# Patient Record
Sex: MALE | URBAN - NONMETROPOLITAN AREA
[De-identification: names, ages, dates, MRNs, and addresses within clinical notes are randomized per-mention and may not be internally consistent; named-entity substitution may affect disease eponyms.]

---

## 2019-10-15 ENCOUNTER — APPOINTMENT (OUTPATIENT)
Dept: URBAN - NONMETROPOLITAN AREA CLINIC 39 | Age: 74
Setting detail: DERMATOLOGY
End: 2019-10-16

## 2019-10-15 DIAGNOSIS — I89.0 LYMPHEDEMA, NOT ELSEWHERE CLASSIFIED: ICD-10-CM

## 2019-10-15 PROCEDURE — OTHER ORDER TESTS: OTHER

## 2019-10-15 PROCEDURE — OTHER ADDITIONAL NOTES: OTHER

## 2019-10-15 PROCEDURE — OTHER MIPS QUALITY: OTHER

## 2019-10-15 PROCEDURE — OTHER COUNSELING: OTHER

## 2019-10-15 PROCEDURE — 99202 OFFICE O/P NEW SF 15 MIN: CPT

## 2019-10-15 PROCEDURE — OTHER REASSURANCE: OTHER

## 2019-10-15 ASSESSMENT — LOCATION DETAILED DESCRIPTION DERM
LOCATION DETAILED: LEFT DISTAL PRETIBIAL REGION
LOCATION DETAILED: RIGHT DISTAL PRETIBIAL REGION

## 2019-10-15 ASSESSMENT — LOCATION SIMPLE DESCRIPTION DERM
LOCATION SIMPLE: LEFT PRETIBIAL REGION
LOCATION SIMPLE: RIGHT PRETIBIAL REGION

## 2019-10-15 ASSESSMENT — LOCATION ZONE DERM: LOCATION ZONE: LEG

## 2019-10-15 NOTE — PROCEDURE: ADDITIONAL NOTES
Detail Level: Detailed
Additional Notes: Patient reports he is currently applying lanolin cream once daily to the affected areas and wearing compression stockings. He notes he follows a strict no salt diet as best as he can and he’s currently on Lasix. He reports he presents today because there is an “open weeping area.” He states he thinks it is infected. He states he was put on oral antibiotics by his [facility] with no improvement. He completed these antibiotics several weeks ago. Patient reports the open wound was not cultured.  \\nOpen wound cultured on exam today. Will contact patient once results received and send appropriate antibiotics at that time. Patient to continue applying lanolin cream and wearing compression stockings daily. Also recommend applying Vaseline daily. Patient to continue following with cardiologist for diuretic management.

## 2019-10-15 NOTE — HPI: RASH
How Severe Is Your Rash?: moderate
Is This A New Presentation, Or A Follow-Up?: Rash
Additional History: Patient reports he believes he has an infection. He notes he was treated with antibiotics by his physician at the [facility] several weeks ago with no improvement. Patient presents today for further evaluation and management.

## 2019-10-24 ENCOUNTER — RX ONLY (RX ONLY)
Age: 74
End: 2019-10-24

## 2019-10-24 RX ORDER — AMPICILLIN 500 MG/1
CAPSULE ORAL
Qty: 28 | Refills: 0 | Status: ERX | COMMUNITY
Start: 2019-10-24

## 2019-10-24 RX ORDER — LEVOFLOXACIN 750 MG/1
TABLET, FILM COATED ORAL
Qty: 7 | Refills: 0 | Status: ERX | COMMUNITY
Start: 2019-10-24

## 2019-11-01 ENCOUNTER — RX ONLY (RX ONLY)
Age: 74
End: 2019-11-01

## 2019-11-01 RX ORDER — BISMUTH TRIBROMOPH/PETROLATUM 4"X108"
BANDAGE TOPICAL
Qty: 1 | Refills: 2 | Status: ERX | COMMUNITY
Start: 2019-11-01

## 2020-07-16 ENCOUNTER — APPOINTMENT (OUTPATIENT)
Dept: URBAN - NONMETROPOLITAN AREA CLINIC 39 | Age: 75
Setting detail: DERMATOLOGY
End: 2020-07-16

## 2020-07-16 DIAGNOSIS — D485 NEOPLASM OF UNCERTAIN BEHAVIOR OF SKIN: ICD-10-CM

## 2020-07-16 PROBLEM — D48.5 NEOPLASM OF UNCERTAIN BEHAVIOR OF SKIN: Status: ACTIVE | Noted: 2020-07-16

## 2020-07-16 PROBLEM — D23.5 OTHER BENIGN NEOPLASM OF SKIN OF TRUNK: Status: ACTIVE | Noted: 2020-07-16

## 2020-07-16 PROBLEM — I10 ESSENTIAL (PRIMARY) HYPERTENSION: Status: ACTIVE | Noted: 2020-07-16

## 2020-07-16 PROBLEM — I25.10 ATHEROSCLEROTIC HEART DISEASE OF NATIVE CORONARY ARTERY WITHOUT ANGINA PECTORIS: Status: ACTIVE | Noted: 2020-07-16

## 2020-07-16 PROBLEM — E13.9 OTHER SPECIFIED DIABETES MELLITUS WITHOUT COMPLICATIONS: Status: ACTIVE | Noted: 2020-07-16

## 2020-07-16 PROCEDURE — 99213 OFFICE O/P EST LOW 20 MIN: CPT | Mod: 25

## 2020-07-16 PROCEDURE — OTHER MIPS QUALITY: OTHER

## 2020-07-16 PROCEDURE — OTHER ADDITIONAL NOTES: OTHER

## 2020-07-16 PROCEDURE — OTHER COUNSELING: OTHER

## 2020-07-16 PROCEDURE — 11102 TANGNTL BX SKIN SINGLE LES: CPT

## 2020-07-16 PROCEDURE — OTHER BIOPSY BY SHAVE METHOD: OTHER

## 2020-07-16 ASSESSMENT — LOCATION ZONE DERM: LOCATION ZONE: TRUNK

## 2020-07-16 ASSESSMENT — LOCATION SIMPLE DESCRIPTION DERM: LOCATION SIMPLE: LEFT BUTTOCK

## 2020-07-16 ASSESSMENT — LOCATION DETAILED DESCRIPTION DERM: LOCATION DETAILED: LEFT BUTTOCK

## 2020-07-16 NOTE — PROCEDURE: BIOPSY BY SHAVE METHOD
Was A Bandage Applied: Yes
Type Of Destruction Used: Curettage
Silver Nitrate Text: The wound bed was treated with silver nitrate after the biopsy was performed.
Additional Anesthesia Volume In Cc (Will Not Render If 0): 0
Notification Instructions: Patient will be notified of biopsy results. However, patient instructed to call the office if not contacted within 2 weeks.
Hide Accession Number?: No
Consent: Written consent was obtained and risks were reviewed including but not limited to scarring, infection, bleeding, scabbing, incomplete removal, nerve damage and allergy to anesthesia.
Anesthesia Volume In Cc (Will Not Render If 0): 1
Path Notes (To The Dermatopathologist): 1cm
Billing Type: Third-Party Bill
Electrodesiccation Text: The wound bed was treated with electrodesiccation after the biopsy was performed.
Wound Care: Petrolatum
Information: Selecting Yes will display possible errors in your note based on the variables you have selected. This validation is only offered as a suggestion for you. PLEASE NOTE THAT THE VALIDATION TEXT WILL BE REMOVED WHEN YOU FINALIZE YOUR NOTE. IF YOU WANT TO FAX A PRELIMINARY NOTE YOU WILL NEED TO TOGGLE THIS TO 'NO' IF YOU DO NOT WANT IT IN YOUR FAXED NOTE.
Detail Level: Detailed
Dressing: bandage
Biopsy Type: H and E
Curettage Text: The wound bed was treated with curettage after the biopsy was performed.
Hemostasis: Drysol
Depth Of Biopsy: dermis
Cryotherapy Text: The wound bed was treated with cryotherapy after the biopsy was performed.
Biopsy Method: scissors
Anesthesia Type: 1% lidocaine with epinephrine and a 1:10 solution of 8.4% sodium bicarbonate
Electrodesiccation And Curettage Text: The wound bed was treated with electrodesiccation and curettage after the biopsy was performed.
Post-Care Instructions: I reviewed with the patient in detail post-care instructions. Patient is to keep the biopsy site dry overnight, and then apply bacitracin twice daily until healed. Patient may apply hydrogen peroxide soaks to remove any crusting.

## 2020-07-16 NOTE — PROCEDURE: ADDITIONAL NOTES
Detail Level: Simple
Additional Notes: angiokeratoma corporis diffusum\\n\\nconcern for Fabry's Disease with neuropathy and h/o renal insufficiency. will send PCP note to screen with alpha galactosidase (as he is [facility]) and if positive, he and son (who is in his 40s) will both need to be seen by genetics at [facility] or genetics if offered at [facility]